# Patient Record
Sex: FEMALE | Race: WHITE | URBAN - METROPOLITAN AREA
[De-identification: names, ages, dates, MRNs, and addresses within clinical notes are randomized per-mention and may not be internally consistent; named-entity substitution may affect disease eponyms.]

---

## 2017-02-06 ENCOUNTER — OFFICE VISIT (OUTPATIENT)
Dept: INTERNAL MEDICINE CLINIC | Age: 32
End: 2017-02-06

## 2017-02-06 VITALS
DIASTOLIC BLOOD PRESSURE: 75 MMHG | TEMPERATURE: 98.7 F | RESPIRATION RATE: 16 BRPM | OXYGEN SATURATION: 98 % | WEIGHT: 229 LBS | HEART RATE: 72 BPM | SYSTOLIC BLOOD PRESSURE: 113 MMHG | HEIGHT: 66 IN | BODY MASS INDEX: 36.8 KG/M2

## 2017-02-06 DIAGNOSIS — Z23 ENCOUNTER FOR IMMUNIZATION: ICD-10-CM

## 2017-02-06 DIAGNOSIS — L70.0 ACNE VULGARIS: ICD-10-CM

## 2017-02-06 DIAGNOSIS — K90.41 GLUTEN INTOLERANCE: ICD-10-CM

## 2017-02-06 DIAGNOSIS — F10.20 UNCOMPLICATED ALCOHOL DEPENDENCE (HCC): ICD-10-CM

## 2017-02-06 DIAGNOSIS — Z00.00 ROUTINE GENERAL MEDICAL EXAMINATION AT A HEALTH CARE FACILITY: ICD-10-CM

## 2017-02-06 DIAGNOSIS — F32.0 MILD SINGLE CURRENT EPISODE OF MAJOR DEPRESSIVE DISORDER (HCC): ICD-10-CM

## 2017-02-06 DIAGNOSIS — R53.83 FATIGUE, UNSPECIFIED TYPE: ICD-10-CM

## 2017-02-06 DIAGNOSIS — E66.9 OBESITY (BMI 30-39.9): ICD-10-CM

## 2017-02-06 DIAGNOSIS — Z30.011 ENCOUNTER FOR INITIAL PRESCRIPTION OF CONTRACEPTIVE PILLS: ICD-10-CM

## 2017-02-06 DIAGNOSIS — Z91.010 PEANUT ALLERGY: ICD-10-CM

## 2017-02-06 DIAGNOSIS — Z76.89 ENCOUNTER TO ESTABLISH CARE: Primary | ICD-10-CM

## 2017-02-06 DIAGNOSIS — Z91.011 MILK ALLERGY: ICD-10-CM

## 2017-02-06 DIAGNOSIS — K21.9 GASTROESOPHAGEAL REFLUX DISEASE WITHOUT ESOPHAGITIS: ICD-10-CM

## 2017-02-06 PROBLEM — J30.2 SEASONAL ALLERGIC RHINITIS: Status: ACTIVE | Noted: 2017-02-06

## 2017-02-06 RX ORDER — ADAPALENE 45 G/G
GEL TOPICAL
Qty: 45 G | Refills: 2 | Status: SHIPPED | OUTPATIENT
Start: 2017-02-06

## 2017-02-06 RX ORDER — VENLAFAXINE HYDROCHLORIDE 150 MG/1
CAPSULE, EXTENDED RELEASE ORAL DAILY
COMMUNITY

## 2017-02-06 RX ORDER — OMEPRAZOLE 20 MG/1
20 CAPSULE, DELAYED RELEASE ORAL DAILY
COMMUNITY

## 2017-02-06 RX ORDER — CLINDAMYCIN PHOSPHATE 10 MG/G
GEL TOPICAL 2 TIMES DAILY
Qty: 40 ML | Refills: 2 | Status: SHIPPED | OUTPATIENT
Start: 2017-02-06

## 2017-02-06 RX ORDER — NORGESTIMATE AND ETHINYL ESTRADIOL 0.25-0.035
1 KIT ORAL DAILY
Qty: 3 PACKAGE | Refills: 1 | Status: SHIPPED | OUTPATIENT
Start: 2017-02-06

## 2017-02-06 RX ORDER — FEXOFENADINE HCL 60 MG
TABLET ORAL
COMMUNITY

## 2017-02-06 NOTE — MR AVS SNAPSHOT
Visit Information Date & Time Provider Department Dept. Phone Encounter #  
 2/6/2017  8:30 AM Mer Haines [phone] [id number] Follow-up Instructions Return in about 3 months (around 5/6/2017), or if symptoms worsen or fail to improve, for Weight, alcohol, acne. Upcoming Health Maintenance Date Due  
 PAP AKA CERVICAL CYTOLOGY 2/23/2004 DTaP/Tdap/Td series (2 - Td) 1/1/2020 Allergies as of 2/6/2017  Review Complete On: 2/6/2017 By: Jesus Black LPN Severity Noted Reaction Type Reactions Gluten  02/06/2017    Other (comments) Sores/hives Milk  02/06/2017    Other (comments)  
 swelling Peanut  02/06/2017    Hives Sulfa (Sulfonamide Antibiotics)  02/06/2017    Other (comments)  
 sores Current Immunizations  Never Reviewed Name Date Influenza Vaccine (Quad) PF  Incomplete Tdap 1/1/2010 Not reviewed this visit You Were Diagnosed With   
  
 Codes Comments Encounter to establish care    -  Primary ICD-10-CM: Z76.89 
ICD-9-CM: V65.8 Routine general medical examination at a health care facility     ICD-10-CM: Z00.00 ICD-9-CM: V70.0 Uncomplicated alcohol dependence ([facility] [address]     ICD-10-CM: F10.20 ICD-9-CM: 303.90 Mild single current episode of major depressive disorder (HCC)     ICD-10-CM: F32.0 ICD-9-CM: 296.21 Gastroesophageal reflux disease without esophagitis     ICD-10-CM: K21.9 ICD-9-CM: 530.81 Acne vulgaris     ICD-10-CM: L70.0 ICD-9-CM: 706.1 Obesity (BMI 30-39. 9)     ICD-10-CM: E66.9 ICD-9-CM: 278.00 Fatigue, unspecified type     ICD-10-CM: R53.83 ICD-9-CM: 780.79 Gluten intolerance     ICD-10-CM: K90.0 ICD-9-CM: 579.0 Peanut allergy     ICD-10-CM: Z91.010 
ICD-9-CM: V15.01 Milk allergy     ICD-10-CM: O74.895 
ICD-9-CM: V15.02 Encounter for immunization     ICD-10-CM: K58 ICD-9-CM: V03.89   
 Encounter for initial prescription of contraceptive pills     ICD-10-CM: Z30.011 ICD-9-CM: V25.01 Vitals BP Pulse Temp Resp Height(growth percentile) Weight(growth percentile) 113/75 (BP 1 Location: Left arm, BP Patient Position: Sitting) 72 98.7 °F (37.1 °C) (Oral) 16 5' 6\" (1.676 m) 229 lb (103.9 kg) LMP SpO2 BMI OB Status Smoking Status 01/30/2017 98% 36.96 kg/m2 Having regular periods Never Smoker Vitals History BMI and BSA Data Body Mass Index Body Surface Area  
 36.96 kg/m 2 2.2 m 2 Preferred Pharmacy Pharmacy Name Phone [phone] [phone] Your Updated Medication List  
  
   
This list is accurate as of: 2/6/17  9:36 AM.  Always use your most recent med list.  
  
  
  
  
 adapalene 0.1 % topical gel Commonly known as:  DIFFERIN Apply  to affected area nightly. use small amount as directed  
  
 clindamycin 1 % topical gel Commonly known as:  CLINDAGEL Apply  to affected area two (2) times a day. use thin film on affected area  
  
 fexofenadine 60 mg tablet Commonly known as:  Gloriajean Sheikh Take  by mouth. norgestimate-ethinyl estradiol 0.25-35 mg-mcg Tab Commonly known as:  Kortney Boards Take 1 Tab by mouth daily. omeprazole 20 mg capsule Commonly known as:  PRILOSEC Take 20 mg by mouth daily. venlafaxine- mg capsule Commonly known as:  EFFEXOR-XR Take  by mouth daily. Prescriptions Sent to Pharmacy Refills  
 norgestimate-ethinyl estradiol (ORTHO-CYCLEN, SPRINTEC) 0.25-35 mg-mcg tab 1 Sig: Take 1 Tab by mouth daily. Class: Normal  
 Pharmacy: [address] Ph #: [phone] Route: Oral  
 adapalene (DIFFERIN) 0.1 % topical gel 2 Sig: Apply  to affected area nightly. use small amount as directed  Class: Normal  
 [facility] Ph #: [phone] Route: Topical  
 clindamycin (CLINDAGEL) 1 % topical gel 2 Sig: Apply  to affected area two (2) times a day. use thin film on affected area Class: Normal  
 [facility] Ph #: [phone] Route: Topical  
  
We Performed the Following INFLUENZA VIRUS VAC QUAD,SPLIT,PRESV FREE SYRINGE 3/> YRS IM [id number] CPT(R)] Follow-up Instructions Return in about 3 months (around 5/6/2017), or if symptoms worsen or fail to improve, for Weight, alcohol, acne. To-Do List   
 02/07/2017 Lab:  CBC WITH AUTOMATED DIFF   
  
 02/07/2017 Lab:  HEMOGLOBIN A1C WITH EAG   
  
 02/07/2017 Lab:  LIPID PANEL   
  
 02/07/2017 Lab:  METABOLIC PANEL, COMPREHENSIVE   
  
 02/07/2017 Lab:  TSH AND FREE T4   
  
 02/07/2017 Lab:  VITAMIN B12 & FOLATE   
  
 02/07/2017 Lab:  VITAMIN D, 25 HYDROXY Patient Instructions Vaccine Information Statement Influenza (Flu) Vaccine (Inactivated or Recombinant): What you need to know Many Vaccine Information Statements are available in Croatian and other languages. See www.immunize.org/vis Hojas de Información Sobre Vacunas están disponibles en Español y en muchos otros idiomas. Visite www.immunize.org/vis 1. Why get vaccinated? Influenza (flu) is a contagious disease that spreads around the [place] every year, usually between October and May. Flu is caused by influenza viruses, and is spread mainly by coughing, sneezing, and close contact. Anyone can get flu. Flu strikes suddenly and can last several days. Symptoms vary by age, but can include: 
 fever/chills  sore throat  muscle aches  fatigue  cough  headache  runny or stuffy nose Flu can also lead to pneumonia and blood infections, and cause diarrhea and seizures in children. If you have a medical condition, such as heart or lung disease, flu can make it worse. Flu is more dangerous for some people. Infants and young children, people 72years of age and older, pregnant women, and people with certain health conditions or a weakened immune system are at greatest risk. Each year thousands of people in the [facility] die from flu, and many more are hospitalized. Flu vaccine can: 
 keep you from getting flu, 
 make flu less severe if you do get it, and 
 keep you from spreading flu to your family and other people. 2. Inactivated and recombinant flu vaccines A dose of flu vaccine is recommended every flu season. Children 6 months through 6years of age may need two doses during the same flu season. Everyone else needs only one dose each flu season. Some inactivated flu vaccines contain a very small amount of a mercury-based preservative called thimerosal. Studies have not shown thimerosal in vaccines to be harmful, but flu vaccines that do not contain thimerosal are available. There is no live flu virus in flu shots. They cannot cause the flu. There are many flu viruses, and they are always changing. Each year a new flu vaccine is made to protect against three or four viruses that are likely to cause disease in the upcoming flu season. But even when the vaccine doesnt exactly match these viruses, it may still provide some protection Flu vaccine cannot prevent: 
 flu that is caused by a virus not covered by the vaccine, or 
 illnesses that look like flu but are not. It takes about 2 weeks for protection to develop after vaccination, and protection lasts through the flu season. 3. Some people should not get this vaccine Tell the person who is giving you the vaccine:  If you have any severe, life-threatening allergies.    
If you ever had a life-threatening allergic reaction after a dose of flu vaccine, or have a severe allergy to any part of this vaccine, you may be advised not to get vaccinated. Most, but not all, types of flu vaccine contain a small amount of egg protein.  If you ever had Guillain-Barré Syndrome (also called GBS). Some people with a history of GBS should not get this vaccine. This should be discussed with your doctor.  If you are not feeling well. It is usually okay to get flu vaccine when you have a mild illness, but you might be asked to come back when you feel better. 4. Risks of a vaccine reaction With any medicine, including vaccines, there is a chance of reactions. These are usually mild and go away on their own, but serious reactions are also possible. Most people who get a flu shot do not have any problems with it. Minor problems following a flu shot include:  
 soreness, redness, or swelling where the shot was given  hoarseness  sore, red or itchy eyes  cough  fever  aches  headache  itching  fatigue If these problems occur, they usually begin soon after the shot and last 1 or 2 days. More serious problems following a flu shot can include the following:  There may be a small increased risk of Guillain-Barré Syndrome (GBS) after inactivated flu vaccine. This risk has been estimated at 1 or 2 additional cases per million people vaccinated. This is much lower than the risk of severe complications from flu, which can be prevented by flu vaccine.  Young children who get the flu shot along with pneumococcal vaccine (PCV13) and/or DTaP vaccine at the same time might be slightly more likely to have a seizure caused by fever. Ask your doctor for more information. Tell your doctor if a child who is getting flu vaccine has ever had a seizure. Problems that could happen after any injected vaccine:  People sometimes faint after a medical procedure, including vaccination. Sitting or lying down for about 15 minutes can help prevent fainting, and injuries caused by a fall. Tell your doctor if you feel dizzy, or have vision changes or ringing in the ears.  Some people get severe pain in the shoulder and have difficulty moving the arm where a shot was given. This happens very rarely.  Any medication can cause a severe allergic reaction. Such reactions from a vaccine are very rare, estimated at about 1 in a million doses, and would happen within a few minutes to a few hours after the vaccination. As with any medicine, there is a very remote chance of a vaccine causing a serious injury or death. The safety of vaccines is always being monitored. For more information, visit: www.cdc.gov/vaccinesafety/ 
 
 
The [facility] Vaccine Injury Compensation Program (VICP) is a federal program that was created to compensate people who may have been injured by certain vaccines.  
 
Persons who believe they may have been injured by a vaccine can learn about the program and about filing a claim by calling 3-283.775.6589 or visiting the [employer] website at www.[facility].gov/vaccinecompensation. There is a time limit to file a claim for compensation. 7. How can I learn more?  Ask your healthcare provider. He or she can give you the vaccine package insert or suggest other sources of information.  Call your local or state health department.  Contact the Centers for Disease Control and Prevention (CDC): 
- Call 2-980.629.4605 (1-800-CDC-INFO) or 
- Visit CDCs website at www.cdc.gov/flu Vaccine Information Statement Inactivated Influenza Vaccine 8/7/2015 
[address] Thelda Cushing [id number] [facility] and Angella Joy Centers for Disease Control and Prevention Office Use Only When You Are Overweight: Care Instructions Your Care Instructions If you're overweight, your doctor may recommend that you make changes in your eating and exercise habits. Being overweight can lead to serious health problems, such as high blood pressure, heart disease, type 2 diabetes, and arthritis, or it can make these problems worse. Eating a healthy diet and being more active can help you reach and stay at a healthy weight. You dont have to make huge changes all at once. Start by making small changes in your eating and exercise habits. To lose weight, you need to burn more calories than you take in. You can do this by eating healthy foods in reasonable amounts and becoming more active every day. Follow-up care is a key part of your treatment and safety. Be sure to make and go to all appointments, and call your doctor if you are having problems. It's also a good idea to know your test results and keep a list of the medicines you take. How can you care for yourself at home? · Improve your eating habits. You'll be more successful if you work on changing one eating habit at a time. All foods, if eaten in moderation, can be part of healthy eating. Remember to: ¨ Eat a variety of foods from each food group. Include grains, vegetables, fruits, dairy, and protein foods. ¨ Limit foods high in fat, sugar, and calories. ¨ Eat slowly. And don't do anything else, such as watch TV, while you are eating. ¨ Pay attention to portion sizes. Put your food on a smaller plate. ¨ Plan your meals ahead of time. You'll be less likely to grab something that's not as healthy. · Get active. Regular activity can help you feel better, have more energy, and burn more calories. If you haven't been active, start slowly. Start with at least 30 minutes of moderate activity on most days of the week. Then gradually increase the amount of activity. Try for 60 or 90 minutes a day, at least 5 days a week. There are a lot of ways to fit activity into your life. You can: 
¨ Walk or bike to the store. Or walk with a friend, or walk the dog. ¨ Mow the lawn, rake leaves, shovel snow, or do some gardening. ¨ Use the stairs instead of the elevator, at least for a few floors. · Change your thinking. Your thoughts have a lot to do with how you feel and what you do. When you're trying to reach a healthy weight, changing how you think about certain things may help. Here are some ideas: ¨ Don't compare yourself to others. Healthy bodies come in all shapes and sizes. ¨ Pay attention to how hungry or full you feel. When you eat, be aware of why you're eating and how much you're eating. ¨ Focus on improving your health instead of dieting. Dieting almost never works over the long term. · Ask your doctor about other health professionals who can help you reach a healthy weight. ¨ A dietitian can help you make healthy changes in your diet. ¨ An exercise specialist or  can help you develop a safe and effective exercise program. 
¨ A counselor or psychiatrist can help you cope with issues such as depression, anxiety, or family problems that can make it hard to focus on reaching a healthy weight. · Get support from your family, your doctor, your friends, a support groupand support yourself. Where can you learn more? Go to http://haris-diogo.info/. Enter U801 in the search box to learn more about \"When You Are Overweight: Care Instructions. \" Current as of: February 16, 2016 Content Version: 11.1 © 7608-0685 official.fm. Care instructions adapted under license by [employer] (which disclaims liability or warranty for this information). If you have questions about a medical condition or this instruction, always ask your healthcare professional. [address] any warranty or liability for your use of this information. Introducing [facility]! [employer] introduces [employer] patient portal. Now you can access parts of your medical record, email your doctor's office, and request medication refills online. 1. In your internet browser, go to [email or web address] "[email or web address]/Shoette 2. Click on the First Time User? Click Here link in the Sign In box. You will see the New Member Sign Up page. 3. Enter your [employer] Access Code exactly as it appears below. You will not need to use this code after youve completed the sign-up process. If you do not sign up before the expiration date, you must request a new code. · [employer] Access Code: [email or web address] Expires: 5/7/2017  9:36 AM 
 
4. Enter the last four digits of your Social Security Number (xxxx) and Date of Birth (mm/dd/yyyy) as indicated and click Submit. You will be taken to the next sign-up page. 5. Create a [employer] ID. This will be your [employer] login ID and cannot be changed, so think of one that is secure and easy to remember. 6. Create a [employer] password. You can change your password at any time. 7. Enter your Password Reset Question and Answer. This can be used at a later time if you forget your password. 8. Enter your e-mail address. You will receive e-mail notification when new information is available in [address] 9. Click Sign Up. You can now view and download portions of your medical record. 10. Click the Download Summary menu link to download a portable copy of your medical information. If you have questions, please visit the Frequently Asked Questions section of the [employer] website. Remember, [employer] is NOT to be used for urgent needs. For medical emergencies, dial 911. Now available from your iPhone and Android! Please provide this summary of care documentation to your next provider. Your primary care clinician is listed as Dorinda Number. If you have any questions after today's visit, please call [phone].

## 2017-02-06 NOTE — PROGRESS NOTES
Reviewed record  In preparation for visit and have obtained necessary documentation. 1. Have you been to the ER, urgent care clinic since your last visit? Hospitalized since your last visit?had baby at [facility] baby is 8 months old  2. Have you seen or consulted any other health care providers outside of the [address] [place] [address] since your last visit? Include any pap smears or colon screening. Gyn sees Dr Zully Madison with [facility]    Patient does not currently have advance directives. Patient given information on advance directives and brochure and printed blank advance directive form made available to patient. Patient is also asked to make copy of directives available to this office for our[facility] once advanced directives are completed. Works as an economist for [employer] . Has 6 sandra old baby. After giving patient VIS and receiving written consent from patient and per orders of Dr Jimbo Marie patient given influenza vaccine 0.5 ml in right deltoid IM . Patient tolerated injection with no adverse reaction.  Lot # [id number] expiration date 6/30/17 [facility] # [id number]

## 2017-02-06 NOTE — PATIENT INSTRUCTIONS
Vaccine Information Statement    Influenza (Flu) Vaccine (Inactivated or Recombinant): What you need to know    Many Vaccine Information Statements are available in Latvian and other languages. See www.immunize.org/vis  Hojas de Información Sobre Vacunas están disponibles en Español y en muchos otros idiomas. Visite www.immunize.org/vis    1. Why get vaccinated? Influenza (flu) is a contagious disease that spreads around the [place] every year, usually between October and May. Flu is caused by influenza viruses, and is spread mainly by coughing, sneezing, and close contact. Anyone can get flu. Flu strikes suddenly and can last several days. Symptoms vary by age, but can include:   fever/chills   sore throat   muscle aches   fatigue   cough   headache    runny or stuffy nose    Flu can also lead to pneumonia and blood infections, and cause diarrhea and seizures in children. If you have a medical condition, such as heart or lung disease, flu can make it worse. Flu is more dangerous for some people. Infants and young children, people 72years of age and older, pregnant women, and people with certain health conditions or a weakened immune system are at greatest risk. Each year thousands of people in the [facility] die from flu, and many more are hospitalized. Flu vaccine can:   keep you from getting flu,   make flu less severe if you do get it, and   keep you from spreading flu to your family and other people. 2. Inactivated and recombinant flu vaccines    A dose of flu vaccine is recommended every flu season. Children 6 months through 6years of age may need two doses during the same flu season. Everyone else needs only one dose each flu season.        Some inactivated flu vaccines contain a very small amount of a mercury-based preservative called thimerosal. Studies have not shown thimerosal in vaccines to be harmful, but flu vaccines that do not contain thimerosal are available. There is no live flu virus in flu shots. They cannot cause the flu. There are many flu viruses, and they are always changing. Each year a new flu vaccine is made to protect against three or four viruses that are likely to cause disease in the upcoming flu season. But even when the vaccine doesnt exactly match these viruses, it may still provide some protection    Flu vaccine cannot prevent:   flu that is caused by a virus not covered by the vaccine, or   illnesses that look like flu but are not. It takes about 2 weeks for protection to develop after vaccination, and protection lasts through the flu season. 3. Some people should not get this vaccine    Tell the person who is giving you the vaccine:     If you have any severe, life-threatening allergies. If you ever had a life-threatening allergic reaction after a dose of flu vaccine, or have a severe allergy to any part of this vaccine, you may be advised not to get vaccinated. Most, but not all, types of flu vaccine contain a small amount of egg protein.  If you ever had Guillain-Barré Syndrome (also called GBS). Some people with a history of GBS should not get this vaccine. This should be discussed with your doctor.  If you are not feeling well. It is usually okay to get flu vaccine when you have a mild illness, but you might be asked to come back when you feel better. 4. Risks of a vaccine reaction    With any medicine, including vaccines, there is a chance of reactions. These are usually mild and go away on their own, but serious reactions are also possible. Most people who get a flu shot do not have any problems with it.      Minor problems following a flu shot include:    soreness, redness, or swelling where the shot was given     hoarseness   sore, red or itchy eyes   cough   fever   aches   headache   itching   fatigue  If these problems occur, they usually begin soon after the shot and last 1 or 2 days. More serious problems following a flu shot can include the following:     There may be a small increased risk of Guillain-Barré Syndrome (GBS) after inactivated flu vaccine. This risk has been estimated at 1 or 2 additional cases per million people vaccinated. This is much lower than the risk of severe complications from flu, which can be prevented by flu vaccine.  Young children who get the flu shot along with pneumococcal vaccine (PCV13) and/or DTaP vaccine at the same time might be slightly more likely to have a seizure caused by fever. Ask your doctor for more information. Tell your doctor if a child who is getting flu vaccine has ever had a seizure. Problems that could happen after any injected vaccine:      People sometimes faint after a medical procedure, including vaccination. Sitting or lying down for about 15 minutes can help prevent fainting, and injuries caused by a fall. Tell your doctor if you feel dizzy, or have vision changes or ringing in the ears.  Some people get severe pain in the shoulder and have difficulty moving the arm where a shot was given. This happens very rarely.  Any medication can cause a severe allergic reaction. Such reactions from a vaccine are very rare, estimated at about 1 in a million doses, and would happen within a few minutes to a few hours after the vaccination. As with any medicine, there is a very remote chance of a vaccine causing a serious injury or death. The safety of vaccines is always being monitored. For more information, visit: www.cdc.gov/vaccinesafety/    5. What if there is a serious reaction? What should I look for?  Look for anything that concerns you, such as signs of a severe allergic reaction, very high fever, or unusual behavior.     Signs of a severe allergic reaction can include hives, swelling of the face and throat, difficulty breathing, a fast heartbeat, dizziness, and weakness  usually within a few minutes to a few hours after the vaccination. What should I do?  If you think it is a severe allergic reaction or other emergency that cant wait, call 9-1-1 and get the person to the nearest hospital. Otherwise, call your doctor.  Reactions should be reported to the Vaccine Adverse Event Reporting System (VAERS). Your doctor should file this report, or you can do it yourself through  the VAERS web site at www.vaers. [facility].gov, or by calling 7-303.448.2155. VAERS does not give medical advice. 6. The National Vaccine Injury Compensation Program    The [facility] Vaccine Injury Compensation Program (VICP) is a federal program that was created to compensate people who may have been injured by certain vaccines. Persons who believe they may have been injured by a vaccine can learn about the program and about filing a claim by calling 4-509.894.9543 or visiting the [employer] website at www.[facility].gov/vaccinecompensation. There is a time limit to file a claim for compensation. 7. How can I learn more?  Ask your healthcare provider. He or she can give you the vaccine package insert or suggest other sources of information.  Call your local or state health department.  Contact the Centers for Disease Control and Prevention (CDC):  - Call 8-932.751.8901 (1-800-CDC-INFO) or  - Visit CDCs website at www.cdc.gov/flu    Vaccine Information Statement   Inactivated Influenza Vaccine   8/7/2015  42 VERONIQUE Still [id number]-[id number]    Department of Health and Human Services  Centers for Disease Control and Prevention    Office Use Only       When You Are Overweight: Care Instructions  Your Care Instructions  If you're overweight, your doctor may recommend that you make changes in your eating and exercise habits. Being overweight can lead to serious health problems, such as high blood pressure, heart disease, type 2 diabetes, and arthritis, or it can make these problems worse.  Eating a healthy diet and being more active can help you reach and stay at a healthy weight. You dont have to make huge changes all at once. Start by making small changes in your eating and exercise habits. To lose weight, you need to burn more calories than you take in. You can do this by eating healthy foods in reasonable amounts and becoming more active every day. Follow-up care is a key part of your treatment and safety. Be sure to make and go to all appointments, and call your doctor if you are having problems. It's also a good idea to know your test results and keep a list of the medicines you take. How can you care for yourself at home? · Improve your eating habits. You'll be more successful if you work on changing one eating habit at a time. All foods, if eaten in moderation, can be part of healthy eating. Remember to:  [facility] a variety of foods from each food group. Include grains, vegetables, fruits, dairy, and protein foods. ¨ Limit foods high in fat, sugar, and calories. ¨ Eat slowly. And don't do anything else, such as watch TV, while you are eating. ¨ Pay attention to portion sizes. Put your food on a smaller plate. ¨ Plan your meals ahead of time. You'll be less likely to grab something that's not as healthy. · Get active. Regular activity can help you feel better, have more energy, and burn more calories. If you haven't been active, start slowly. Start with at least 30 minutes of moderate activity on most days of the week. Then gradually increase the amount of activity. Try for 60 or 90 minutes a day, at least 5 days a week. There are a lot of ways to fit activity into your life. You can:  ¨ Walk or bike to the store. Or walk with a friend, or walk the dog. ¨ Mow the lawn, rake leaves, shovel snow, or do some gardening. ¨ Use the stairs instead of the elevator, at least for a few floors. · Change your thinking. Your thoughts have a lot to do with how you feel and what you do.  When you're trying to reach a healthy weight, changing how you think about certain things may help. Here are some ideas:  ¨ Don't compare yourself to others. Healthy bodies come in all shapes and sizes. ¨ Pay attention to how hungry or full you feel. When you eat, be aware of why you're eating and how much you're eating. ¨ Focus on improving your health instead of dieting. Dieting almost never works over the long term. · Ask your doctor about other health professionals who can help you reach a healthy weight. ¨ A dietitian can help you make healthy changes in your diet. ¨ An exercise specialist or  can help you develop a safe and effective exercise program.  ¨ A counselor or psychiatrist can help you cope with issues such as depression, anxiety, or family problems that can make it hard to focus on reaching a healthy weight. · Get support from your family, your doctor, your friends, a support groupand support yourself. Where can you learn more? Go to http://haris-diogo.info/. Enter M188 in the search box to learn more about \"When You Are Overweight: Care Instructions. \"  Current as of: February 16, 2016  Content Version: 11.1  © 5099-5424 [employer]. Care instructions adapted under license by [employer] (which disclaims liability or warranty for this information). If you have questions about a medical condition or this instruction, always ask your healthcare professional. [address] any warranty or liability for your use of this information.

## 2017-02-06 NOTE — PROGRESS NOTES
CC:  Chief Complaint   Patient presents with   [facility] Establish Care    Immunization/Injection       HISTORY OF PRESENT ILLNESS  Lesly Ibrahim is a 33 y.o. female. Presents to establish care. She has depression, allergic rhinitis, and GERD. Today she is concerned about cutting down on alcohol intake and losing weight. Also complains of fatigue. Drinks 8 glasses of wine a week, mostly over the weekends (Fri. - Sun.) Was drinking more 2 years ago while in graduate school. Knows she is drinking too much and aims to quit completely. Not interested in [place] [address] meetings. Wants to quit on own. Her father is a heavy alcohol drinker. Was overweight before pregnancy, gained about 15 lbs during pregnancy and has been unable to lose it. Wants to lose 20 lbs before aiming to get pregnant again. Never lost it. Wants to lose 20 lbs. Not getting regular exercise. Last week walked 2-3 days a week. Feels like she has no time to exercise because of work and home responsibilities. Using condoms for contraception, but would like to get back on birth control pills. Also requests prescriptions for Differin and clindamycin gel for acne. Brought in copy of lab results dated 9/2/14: allergen positive for wheat/gluten, peanut, and milk; tot chol 206, . A1c 5.4%; normal CMP, CBC, TSH, HIV, vitamin B12; vitamin D low at 21.8, CRP high at 10.05 (nl 0-3.0). Soc Hx  . Has a 9 month old child. 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 Never smoker. Drinks 8 glasses of wine over the weekends. Denies recreational drug use. Drinks 4 Diet Cokes a day. Does not get regular exercise.     Health Maintenance  Flu vaccine: 2/6/17      Tetanus vaccine: 2010     Eye exam: 2015  Lipids: will check today (not fasting)  A1c: will check today   Advanced Directives: given information  End of Life: given information      ROS  Constitutional: negative for fevers, chills, night sweats, fatigue, weight loss  Eyes: negative for visual disturbance and irritation  ENT:   negative for tinnitus, sore throat, nasal congestion, ear pains, hoarseness  Respiratory:  negative for cough, hemoptysis, dyspnea, wheezing  CV:   negative for chest pain, palpitations, lower extremity edema  GI:   negative for heartburn, abd pain, nausea, vomiting, diarrhea, constipation  Endo:               negative for polyuria, polydipsia, polyphagia, cold/heat intolerance  Genitourinary: negative for frequency, dysuria, hematuria  Integument:  negative for rash and pruritus  Hematologic:  negative for easy bruising and gum/nose bleeding  Musculoskel: negative for myalgias, joint pain, back pain, muscle weakness  Neurological:  negative for headaches, dizziness, gait problems  Behavl/Psych: negative for feelings of anxiety, depression, mood change      History reviewed. No pertinent past medical history. Past Surgical History   Procedure Laterality Date    Hx appendectomy  2005     Social History     Social History    Marital status:      Spouse name: N/A    Number of children: N/A    Years of education: N/A     Social History Main Topics    Smoking status: Never Smoker    Smokeless tobacco: None    Alcohol use 4.8 oz/week     8 Glasses of wine per week    Drug use: No    Sexual activity: Not Asked     Other Topics Concern    None     Social History Narrative    None     Family History   Problem Relation Age of Onset    Cancer Mother      colon    Other Mother      auto immune aplastic anemia    Hypertension Father     No Known Problems Brother     Alzheimer Maternal Grandfather     Other Paternal Grandmother      lymphoma     Not on File  Current Outpatient Prescriptions   Medication Sig Dispense Refill    venlafaxine-SR (EFFEXOR-XR) 150 mg capsule Take  by mouth daily.  fexofenadine (ALLEGRA) 60 mg tablet Take  by mouth.  omeprazole (PRILOSEC) 20 mg capsule Take 20 mg by mouth daily.            PHYSICAL EXAM  Visit Vitals    /75 (BP 1 Location: Left arm, BP Patient Position: Sitting)    Pulse 72    Temp 98.7 °F (37.1 °C) (Oral)    Resp 16    Ht 5' 6\" (1.676 m)    Wt 229 lb (103.9 kg)    LMP 01/30/2017    SpO2 98%    BMI 36.96 kg/m2       General: Obese. In no distress. Alert and oriented to person, place, and time. Head: Normocephalic, atraumatic. Eyes: Conjunctiva clear. Pupils equal, round, reactive to light. Extraocular movements intact. Ears/Nose: Nares normal. Septum midline. Normal nasal mucosa. No drainage or sinus tenderness. Mouth/Throat: Lips, mucosa, and tongue normal. Oropharynx benign. Neck: Supple, symmetrical, trachea midline, no lymphadenopathy, no carotid bruits, no JVD, thyroid: not enlarged, symmetric, no tenderness/mass/nodules. Lungs: Clear to auscultation bilaterally. No crackles or wheezes. Chest Wall: No tenderness or deformity. Heart: RRR, normal S1 and S2, no murmur, click, rub, or gallop. Back: Symmetric. ROM normal. No CVA tenderness. Abdomen: Soft, non-distended, bowel sounds normal. No tenderness. No masses. No hepatosplenomegaly. Lymph nodes: Cervical and supraclavicular nodes normal.  Extremities: No clubbing, cyanosis, or edema. Skin: Skin color, texture, turgor normal. Scattered acne comedones on face. Pulses: 2+ and symmetric at dorsalis pedis and posterior tibialis pulses. Neurological: CN II-XII intact. Normal strength throughout. Musculoskeletal: Gait normal. ROM normal at both knees. Psychiatric: Normal mood and affect. Behavior is normal.          ASSESSMENT AND PLAN    ICD-10-CM ICD-9-CM    1. Encounter to establish care Z76.89 V65.8    2. Routine general medical examination at a health care facility Z00.00 V70.0 LIPID PANEL      METABOLIC PANEL, COMPREHENSIVE      CBC WITH AUTOMATED DIFF      HEMOGLOBIN A1C WITH EAG   3. Uncomplicated alcohol dependence ([facility] [address] F10.20 303.90    4.  Mild single current episode of major depressive disorder (HCC) F32.0 296.21    5. Gastroesophageal reflux disease without esophagitis K21.9 530.81    6. Acne vulgaris L70.0 706.1 adapalene (DIFFERIN) 0.1 % topical gel      clindamycin (CLINDAGEL) 1 % topical gel   7. Obesity (BMI 30-39. 9) E66.9 278.00 TSH AND FREE T4      VITAMIN D, 25 HYDROXY      VITAMIN B12 & FOLATE   8. Fatigue, unspecified type R53.83 780.79    9. Gluten intolerance K90.0 579.0    10. Peanut allergy Z91.010 V15.01    11. Milk allergy Z91.011 V15.02    12. Encounter for immunization Z23 V03.89 INFLUENZA VIRUS VAC QUAD,SPLIT,PRESV FREE SYRINGE 3/> YRS IM   13. Encounter for initial prescription of contraceptive pills Z30.011 V25.01 norgestimate-ethinyl estradiol (ORTHO-CYCLEN, [facility]) 0.25-35 mg-mcg tab       Candi Steve was seen today for establish care and immunization/injection. Diagnoses and all orders for this visit:    Encounter to establish care  Medical records requested. Routine general medical examination at a health care facility  -     LIPID PANEL; Future  -     METABOLIC PANEL, COMPREHENSIVE; Future  -     CBC WITH AUTOMATED DIFF; Future  -     HEMOGLOBIN A1C WITH EAG; Future    Uncomplicated alcohol dependence ([facility] [address]  Counseled on alcohol cessation. Patient wants to quit drinking on her own. Mild single current episode of major depressive disorder ([facility] [address]  Controlled. Continue Effexor. Gastroesophageal reflux disease without esophagitis    Acne vulgaris  -     Start adapalene (DIFFERIN) 0.1 % topical gel; Apply  to affected area nightly. use small amount as directed  -     Start clindamycin (CLINDAGEL) 1 % topical gel; Apply  to affected area two (2) times a day. use thin film on affected area    Obesity (BMI 30-39.9)  -     TSH AND FREE T4; Future  -     VITAMIN D, 25 HYDROXY; Future  -     VITAMIN B12 & FOLATE;  Future    Fatigue, unspecified type    Gluten intolerance    Peanut allergy    Milk allergy    Encounter for immunization  -     Influenza virus vaccine (QUADRIVALENT PRES FREE SYRINGE) IM 3 years and older    Encounter for initial prescription of contraceptive pills  -     norgestimate-ethinyl estradiol (Agatha Party) 0.25-35 mg-mcg tab; Take 1 Tab by mouth daily. Greater than 40 mins direct face-to-face time spent with patient. Greater than 50% of time spent on counseling and coordination of care. Follow-up Disposition:  Return in about 3 months (around 5/6/2017), or if symptoms worsen or fail to improve, for Weight, alcohol, acne. Provided patient and/or family with advanced directive information and answered pertinent questions. Encouraged patient to provide a copy of advanced directive to the office when available. I have discussed the diagnosis with the patient and the intended plan as seen in the above orders. Patient is in agreement. The patient has received an after-visit summary and questions were answered concerning future plans. I have discussed medication side effects and warnings with the patient as well.